# Patient Record
Sex: FEMALE | Race: WHITE | NOT HISPANIC OR LATINO | Employment: OTHER | URBAN - METROPOLITAN AREA
[De-identification: names, ages, dates, MRNs, and addresses within clinical notes are randomized per-mention and may not be internally consistent; named-entity substitution may affect disease eponyms.]

---

## 2017-04-17 ENCOUNTER — OFFICE VISIT (OUTPATIENT)
Dept: OBSTETRICS AND GYNECOLOGY | Age: 62
End: 2017-04-17

## 2017-04-17 VITALS
BODY MASS INDEX: 40.18 KG/M2 | SYSTOLIC BLOOD PRESSURE: 144 MMHG | DIASTOLIC BLOOD PRESSURE: 80 MMHG | WEIGHT: 256 LBS | HEIGHT: 67 IN

## 2017-04-17 DIAGNOSIS — R87.610 ATYPICAL SQUAMOUS CELLS OF UNDETERMINED SIGNIFICANCE ON CYTOLOGIC SMEAR OF CERVIX (ASC-US): ICD-10-CM

## 2017-04-17 DIAGNOSIS — D07.1 VIN III (VULVAR INTRAEPITHELIAL NEOPLASIA III): ICD-10-CM

## 2017-04-17 DIAGNOSIS — Z01.419 ENCOUNTER FOR GYNECOLOGICAL EXAMINATION: Primary | ICD-10-CM

## 2017-04-17 PROBLEM — B97.7 HPV IN FEMALE: Status: ACTIVE | Noted: 2017-04-17

## 2017-04-17 PROCEDURE — 99213 OFFICE O/P EST LOW 20 MIN: CPT | Performed by: OBSTETRICS & GYNECOLOGY

## 2017-04-17 NOTE — PROGRESS NOTES
"Subjective   Chief Complaint   Patient presents with   • Gynecologic Exam     6 month pap      History of Present Illness    Rosalba Moscoso is a very pleasant  62 y.o. female who presents for annual exam.  , Mammo Exam2014 , Contraception pm, Exercise 7 x wkly.  Patient had an abnormal Pap smear in October that showed ASCUS with positive HPV.  She is in for follow-up she did not want any further workup at that time she also is a history of KENRICK    Obstetric History:  OB History     No data available         Menstrual History:     No LMP recorded. Patient is postmenopausal.       Sexual History:       Past Medical History:   Diagnosis Date   • Bronchitis    • Hypertension    • Increased body mass index    • SAB (spontaneous )    • Uterine leiomyoma    • KENRICK III (vulvar intraepithelial neoplasia III)    • KENRICK III (vulvar intraepithelial neoplasia III)     History treated previously by Dr. Gino Duran   • KENRICK III (vulvar intraepithelial neoplasia III)      Past Surgical History:   Procedure Laterality Date   • BREAST AUGMENTATION     • COLONOSCOPY     • HAND SURGERY         SOCIAL Hx:      The following portions of the patient's history were reviewed and updated as appropriate: allergies, current medications, past family history, past medical history, past social history, past surgical history and problem list.    Review of Systems        Except as outlined in history of physical illness, patient denies any changes in her GYN, , GI systems.  All other systems reviewed are negative         Objective   Physical Exam    /80  Ht 67\" (170.2 cm)  Wt 256 lb (116 kg)  BMI 40.1 kg/m2    General: Patient is alert and oriented and appears overall healthy  Neck: Is supple without thyromegaly, no carotid bruits and no lymphadenopathy  Lungs: Clear bilaterally, no wheezing, rhonchi, or rales.  Respiratory rate is normal  Breast: Even symmetrical, no lymphadenopathy, no retraction, no masses appreciated on " either side  Heart: Regular rate and rhythm are appreciated, no murmurs or rubs are heard  Abdomen: Is soft, without organomegaly, bowel sounds are positive, there is no                                rebound or guarding and palpation does not produce any discomfort  Back: Nontender without CVA tenderness  Pelvic: External genitalia appear normal and consistent with mature female.  BUS normal                            Vagina is clean dry without discharge and appears adequately estrogenized, no               lesions or masses are present                                    Assessment/Plan   Rosalba was seen today for gynecologic exam.    Diagnoses and all orders for this visit:    Encounter for gynecological examination    KENRICK III (vulvar intraepithelial neoplasia III)  -     IGP, Apt HPV,rfx 16 / 18,45  No evidence of KENRICK on today's exam  Atypical squamous cells of undetermined significance on cytologic smear of cervix (ASC-US)  EPP Pap smear, if atypia or less returned office in 6 months time discussed nature and etiology of abnormal Pap smears and HPV    Annual Well Woman Exam    Discussed today's findings and concerns with patient in detail.  Continue to recommend regular exercise to include cardiovascular and resistance training and breast self-exam. Wellness lab, mammography, & pap smear, in accordance with age guidelines.  Dietary and caloric recommendations were discussed.    Frequency of bone density testing as well as colonoscopy given the patient's family and past history were reviewed.    All of the patient's questions were addressed and answered, I have encouraged her to call for today's test results if she has not received them within 10 days.  Patient is advised to call with any change in her condition or with any other questions, otherwise return in 12 months for annual examination.           EMR Dragon/ Transcription disclaimer:  Much of the encounter note is an electronic transcription/translation of  spoken language to printed text. The electronic translation of spoken language may permit erroneous, or at times, nonessential words or phrases to be inadvertently transcribes; Although i have reviewed the note for such errors, some may still exist.

## 2017-04-19 LAB
CYTOLOGIST CVX/VAG CYTO: NORMAL
CYTOLOGY CVX/VAG DOC THIN PREP: NORMAL
DX ICD CODE: NORMAL
HIV 1 & 2 AB SER-IMP: NORMAL
HPV I/H RISK 4 DNA CVX QL PROBE+SIG AMP: NEGATIVE
OTHER STN SPEC: NORMAL
PATH REPORT.FINAL DX SPEC: NORMAL
STAT OF ADQ CVX/VAG CYTO-IMP: NORMAL

## 2017-04-24 ENCOUNTER — TELEPHONE (OUTPATIENT)
Dept: OBSTETRICS AND GYNECOLOGY | Age: 62
End: 2017-04-24

## 2017-04-24 NOTE — TELEPHONE ENCOUNTER
----- Message from Aldair Hamilton MD sent at 4/23/2017  6:36 AM EDT -----  Please notify that her recent Pap smear was negative

## 2017-04-25 ENCOUNTER — TELEPHONE (OUTPATIENT)
Dept: OBSTETRICS AND GYNECOLOGY | Age: 62
End: 2017-04-25

## 2017-10-23 ENCOUNTER — OFFICE VISIT (OUTPATIENT)
Dept: OBSTETRICS AND GYNECOLOGY | Age: 62
End: 2017-10-23

## 2017-10-23 VITALS
WEIGHT: 248 LBS | HEIGHT: 67 IN | DIASTOLIC BLOOD PRESSURE: 70 MMHG | BODY MASS INDEX: 38.92 KG/M2 | SYSTOLIC BLOOD PRESSURE: 130 MMHG

## 2017-10-23 DIAGNOSIS — Z00.00 ENCOUNTER FOR ANNUAL PHYSICAL EXAM: ICD-10-CM

## 2017-10-23 DIAGNOSIS — Z01.419 ENCOUNTER FOR GYNECOLOGICAL EXAMINATION: Primary | ICD-10-CM

## 2017-10-23 DIAGNOSIS — B97.7 HPV IN FEMALE: ICD-10-CM

## 2017-10-23 DIAGNOSIS — R63.8 INCREASED BMI: ICD-10-CM

## 2017-10-23 DIAGNOSIS — D07.1 VIN III (VULVAR INTRAEPITHELIAL NEOPLASIA III): ICD-10-CM

## 2017-10-23 PROCEDURE — 99396 PREV VISIT EST AGE 40-64: CPT | Performed by: OBSTETRICS & GYNECOLOGY

## 2017-10-23 NOTE — PROGRESS NOTES
"Subjective   Chief Complaint   Patient presents with   • Gynecologic Exam     Annual      History of Present Illness    Rosalba Moscoso is a very pleasant  62 y.o. female who presents for annual exam.  , Mammo Exam Scheduled at [facility], Exercise 7 times a week    Patient's multiple medical problems were reviewed. She is seen her PCP in a regular basis for those. I discussed the importance of glycemic control exercise. She also has a history of vulvar intraepithelial neoplasia. She has had biopsies in the past and is scheduled to come back in 6 months for follow-up on that as well.  He has no gynecological concerns or complaints.    Obstetric History:  OB History     No data available         Menstrual History:     No LMP recorded. Patient is postmenopausal.       Sexual History:       Past Medical History:   Diagnosis Date   • Bronchitis    • Diabetes mellitus    • Hypertension    • Increased body mass index    • SAB (spontaneous )    • Uterine leiomyoma    • KENRICK III (vulvar intraepithelial neoplasia III)    • KENRICK III (vulvar intraepithelial neoplasia III)     History treated previously by Dr. Gino Duran   • KENRICK III (vulvar intraepithelial neoplasia III)      Past Surgical History:   Procedure Laterality Date   • BREAST AUGMENTATION     • COLONOSCOPY     • HAND SURGERY         SOCIAL Hx:      The following portions of the patient's history were reviewed and updated as appropriate: allergies, current medications, past family history, past medical history, past social history, past surgical history and problem list.    Review of Systems        Except as outlined in history of physical illness, patient denies any changes in her GYN, , GI systems.  All other systems reviewed are negative         Objective   Physical Exam    /70  Ht 67\" (170.2 cm)  Wt 248 lb (112 kg)  BMI 38.84 kg/m2    General: Patient is alert and oriented and appears overall healthy  Neck: Is supple without " thyromegaly, no carotid bruits and no lymphadenopathy  Lungs: Clear bilaterally, no wheezing, rhonchi, or rales.  Respiratory rate is normal  Breast: Even symmetrical, no lymphadenopathy, no retraction, no masses appreciated on either side  Heart: Regular rate and rhythm are appreciated, no murmurs or rubs are heard  Abdomen: Is soft, without organomegaly, bowel sounds are positive, there is no                                rebound or guarding and palpation does not produce any discomfort  Back: Nontender without CVA tenderness  Pelvic: External genitalia appear normal and consistent with mature female.  BUS normal, there are some mild epidermal changes on the vulva they appear smaller than previous exams. In no changes that are clinically worrisome today.                            Vagina is clean dry without discharge and appears adequately estrogenized, no               lesions or masses are present                         Cervix is noninflamed without discharge or lesions.  There is no cervical motion             tenderness.                Uterus is nonenlarged, without tenderness, and no masses or abnormalities are  present               Adnexa are non-enlarged, non tender               Rectal exam is declined today       Patient Active Problem List   Diagnosis   • HPV in female                Assessment/Plan   Rosalba was seen today for gynecologic exam.    Diagnoses and all orders for this visit:    Encounter for gynecological examination  -     IGP, Apt HPV,rfx 16 / 18,45 - ThinPrep Vial, Cervix    HPV in female    Encounter for annual physical exam    Increased BMI    KENRICK III (vulvar intraepithelial neoplasia III)  Exam is unchanged and overall reassuring today will return in 6 months for probable biopsy of a couple areas      Annual Well Woman Exam    Discussed today's findings and concerns with patient in detail.  Continue to recommend regular exercise to include cardiovascular and resistance training and  breast self-exam. Wellness lab, mammography, & pap smear, in accordance with age guidelines.  Dietary and caloric recommendations were discussed.        All of the patient's questions were addressed and answered, I have encouraged her to call for today's test results if she has not received them within 10 days.  Patient is advised to call with any change in her condition or with any other questions, otherwise return in 12 months for annual examination.

## 2018-04-24 ENCOUNTER — OFFICE VISIT (OUTPATIENT)
Dept: OBSTETRICS AND GYNECOLOGY | Age: 63
End: 2018-04-24

## 2018-04-24 VITALS
HEIGHT: 67 IN | BODY MASS INDEX: 38.92 KG/M2 | WEIGHT: 248 LBS | SYSTOLIC BLOOD PRESSURE: 142 MMHG | DIASTOLIC BLOOD PRESSURE: 76 MMHG

## 2018-04-24 DIAGNOSIS — N90.9 VULVAR DISORDER: Primary | ICD-10-CM

## 2018-04-24 PROCEDURE — 56605 BIOPSY OF VULVA/PERINEUM: CPT | Performed by: OBSTETRICS & GYNECOLOGY

## 2018-04-24 PROCEDURE — 56606 BIOPSY OF VULVA/PERINEUM: CPT | Performed by: OBSTETRICS & GYNECOLOGY

## 2018-04-24 NOTE — PROGRESS NOTES
Preoperative diagnosis history of KENRICK 3   Postoperative diagnosis same  Estimated blood loss; 3 cc  Complications none  Anesthetic; Xylocaine 2%  Procedure;;Vulvar biopsy ×3  After reviewing risk and benefits potential complications perineum was prepped and draped usual fashion. Area of lichen sclerosis or white thickening at the upper right side of the labia minora was infiltrated with 2% Xylocaine. 3 separate punch biopsies were performed and obtained and sent to pathology for further study. Silver nitrite sticks were utilized to minimize any bleeding everything was hemostatic patient seemed to tolerate the procedure well. We will await the results. And follow-up as needed. Patient will continue to use her cream for itching.

## 2018-04-27 LAB
DX ICD CODE: NORMAL
DX ICD CODE: NORMAL
PATH REPORT.FINAL DX SPEC: NORMAL
PATH REPORT.GROSS SPEC: NORMAL
PATH REPORT.SITE OF ORIGIN SPEC: NORMAL
PATHOLOGIST NAME: NORMAL
PAYMENT PROCEDURE: NORMAL

## 2018-05-08 ENCOUNTER — TELEPHONE (OUTPATIENT)
Dept: OBSTETRICS AND GYNECOLOGY | Age: 63
End: 2018-05-08

## 2018-05-08 NOTE — TELEPHONE ENCOUNTER
----- Message from Aldair Hamilton MD sent at 4/27/2018  8:58 PM EDT -----  Please notify pt. Biopsy showed no cancer. Keep f/up appt. As discussed

## 2018-12-11 ENCOUNTER — OFFICE VISIT (OUTPATIENT)
Dept: OBSTETRICS AND GYNECOLOGY | Age: 63
End: 2018-12-11

## 2018-12-11 VITALS
DIASTOLIC BLOOD PRESSURE: 92 MMHG | SYSTOLIC BLOOD PRESSURE: 160 MMHG | WEIGHT: 253 LBS | HEIGHT: 67 IN | BODY MASS INDEX: 39.71 KG/M2

## 2018-12-11 DIAGNOSIS — L90.0 LICHEN SCLEROSUS ET ATROPHICUS: ICD-10-CM

## 2018-12-11 DIAGNOSIS — Z01.419 ENCOUNTER FOR GYNECOLOGICAL EXAMINATION: Primary | ICD-10-CM

## 2018-12-11 DIAGNOSIS — B97.7 HPV IN FEMALE: ICD-10-CM

## 2018-12-11 DIAGNOSIS — N90.3 VULVAR INTRAEPITHELIAL NEOPLASIA (VIN): ICD-10-CM

## 2018-12-11 PROCEDURE — 99396 PREV VISIT EST AGE 40-64: CPT | Performed by: OBSTETRICS & GYNECOLOGY

## 2018-12-11 RX ORDER — CLONAZEPAM 1 MG/1
TABLET ORAL
COMMUNITY
Start: 2018-07-25

## 2018-12-11 RX ORDER — INSULIN GLARGINE 100 [IU]/ML
INJECTION, SOLUTION SUBCUTANEOUS
Refills: 3 | COMMUNITY
Start: 2018-10-28

## 2018-12-11 NOTE — PROGRESS NOTES
"Subjective   Chief Complaint   Patient presents with   • Gynecologic Exam     Annual:last pap 10/2017,mammo 2017      History of Present Illness    Rosalba Moscoso is a very pleasant  63 y.o. female who presents for annual exam.  , Mammo Exam May 2017, , Exercise 7 times a week  Patient overall is doing well no gynecological concerns or complaints. The Valisone is used once every 2-3 weeks when she has some itching and that seems to be quite effective. She is still seen her PCP for all other wellness and screening test..    Obstetric History:  OB History     No data available         Menstrual History:     No LMP recorded. Patient is postmenopausal.       Sexual History:       Past Medical History:   Diagnosis Date   • Bronchitis    • Diabetes mellitus (CMS/HCC)    • Hypertension    • Increased body mass index    • SAB (spontaneous )    • Uterine leiomyoma    • KENRICK III (vulvar intraepithelial neoplasia III)    • KENRICK III (vulvar intraepithelial neoplasia III)     History treated previously by Dr. Gino Duran   • KENRICK III (vulvar intraepithelial neoplasia III)      Past Surgical History:   Procedure Laterality Date   • BREAST AUGMENTATION     • COLONOSCOPY     • HAND SURGERY         SOCIAL Hx:      The following portions of the patient's history were reviewed and updated as appropriate: allergies, current medications, past family history, past medical history, past social history, past surgical history and problem list.    Review of Systems        Except as outlined in history of physical illness, patient denies any changes in her GYN, , GI systems.  All other systems reviewed are negative         Objective   Physical Exam    /92   Ht 170.2 cm (67\")   Wt 115 kg (253 lb)   BMI 39.63 kg/m²     General: Patient is alert and oriented and appears overall healthy  Neck: Is supple without thyromegaly, no carotid bruits and no lymphadenopathy  Lungs: Clear bilaterally, no wheezing, rhonchi, or rales.  " Respiratory rate is normal  Breast: Even symmetrical, no lymphadenopathy, no retraction, no masses appreciated on either side  Heart: Regular rate and rhythm are appreciated, no murmurs or rubs are heard  Abdomen: Is soft, without organomegaly, bowel sounds are positive, there is no                                rebound or guarding and palpation does not produce any discomfort  Back: Nontender without CVA tenderness  Pelvic: External genitalia appear normal and consistent with mature female.  BUS normal, there continues to be sclerotic type of changes all along the vulva especially pronounced on the labia minora bilaterally. Previous biopsy sites are well-healed. There is no ulceration or granular tissue noted. Essentially it looks unchanged from last exam              Urethra appears normal and without mass, bladder is nontender and without any               Masses.               Vagina is clean dry without discharge and appears adequately estrogenized, no               lesions or masses are present                         Cervix is noninflamed without discharge or lesions.  There is no cervical motion             tenderness.                Uterus is nonenlarged, without tenderness, and no masses or abnormalities are  present               Adnexa are non-enlarged, non tender               Rectal exam reveals adequate sphincter tone and no masses or lesions are                     appreciated on digital rectal examination.       Patient Active Problem List   Diagnosis   • HPV in female   • Vulvar intraepithelial neoplasia (KENRICK)   • Lichen sclerosus et atrophicus                Assessment/Plan   Rosalba was seen today for gynecologic exam.    Diagnoses and all orders for this visit:    Encounter for gynecological examination  -     IGP, Apt HPV,rfx 16 / 18,45    Vulvar intraepithelial neoplasia (KENRICK)/history  Examination is unchanged from last visit, will have patient return in April for a vulvar survey with probable  full R biopsies. Patient continues to have improvement on her symptoms    HPV in female    Lichen sclerosus et atrophicus  Stable as outlined above with Valisone.    Patient declined smoking cessation.      Annual Well Woman Exam    Discussed today's findings and concerns with patient in detail.  Continue to recommend regular exercise to include cardiovascular and resistance training and breast self-exam. Wellness lab, mammography, & pap smear, in accordance with age guidelines.  Dietary and caloric recommendations were discussed.        All of the patient's questions were addressed and answered, I have encouraged her to call for today's test results if she has not received them within 10 days.  Patient is advised to call with any change in her condition or with any other questions, otherwise return in 12 months for annual examination.

## 2019-05-07 ENCOUNTER — OFFICE VISIT (OUTPATIENT)
Dept: OBSTETRICS AND GYNECOLOGY | Age: 64
End: 2019-05-07

## 2019-05-07 VITALS
BODY MASS INDEX: 39.24 KG/M2 | WEIGHT: 250 LBS | DIASTOLIC BLOOD PRESSURE: 80 MMHG | HEIGHT: 67 IN | SYSTOLIC BLOOD PRESSURE: 144 MMHG

## 2019-05-07 DIAGNOSIS — N90.3 VULVAR INTRAEPITHELIAL NEOPLASIA (VIN): Primary | ICD-10-CM

## 2019-05-07 PROCEDURE — 56605 BIOPSY OF VULVA/PERINEUM: CPT | Performed by: OBSTETRICS & GYNECOLOGY

## 2019-05-07 NOTE — PROGRESS NOTES
Excisional Biopsy Procedure Note    Pre-operative Diagnosis: Suspicious lesion    Post-operative Diagnosis: same    Locations: 11 clock at vaginal introitus    Indications: History of vulvar intraepithelial neoplasia level 3    Anesthesia: Lidocaine 2% without epinephrine without added sodium bicarbonate    Procedure Details   The risks, benefits, indications, potential complications, and alternatives were explained to the patient and informed consent obtained.    The lesion and surrounding area was given a sterile prep using alcohol and draped in the usual sterile fashion. A scalpel, and vulvar punch biopsy was used to excise an elliptical area of skin approximately 1cm by 1cm. The wound was closed with application of silver nitrate and pressure, no suture was used . Antibiotic ointment and a sterile dressing applied. The specimen was sent for pathologic examination. The patient tolerated the procedure well.    EBL: minimal    Findings: Mild acetowhite change at 11:00    Condition: Stable    Complications:  None    Plan:  1. Instructed to keep the wound dry and covered for 24-48 hours and clean thereafter.  2. Warning signs of infection were reviewed.    3. Recommended that the patient use OTC acetaminophen as needed for pain.   4 patient will call for pathology report.    EMR Dragon/ Transcription disclaimer:  Much of the encounter note is an electronic transcription/translation of spoken language to printed text. The electronic translation of spoken language may permit erroneous, or at times, nonessential words or phrases to be inadvertently transcribes; Although i have reviewed the note for such errors, some may still exist.

## 2019-05-14 ENCOUNTER — TELEPHONE (OUTPATIENT)
Dept: OBSTETRICS AND GYNECOLOGY | Age: 64
End: 2019-05-14

## 2019-05-14 LAB
DX ICD CODE: NORMAL
PATH REPORT.FINAL DX SPEC: NORMAL
PATH REPORT.GROSS SPEC: NORMAL
PATH REPORT.MICROSCOPIC SPEC OTHER STN: NORMAL
PATH REPORT.SITE OF ORIGIN SPEC: NORMAL
PATHOLOGIST NAME: NORMAL
PAYMENT PROCEDURE: NORMAL

## 2019-05-14 NOTE — TELEPHONE ENCOUNTER
----- Message from Aldair Hamilton MD sent at 5/14/2019  1:20 PM EDT -----  Please notify pt. That labs are with in normal limits, consistent with lichen sclerosis as discussed, would like to repeat biopsy again next year as discussed

## 2019-05-14 NOTE — PROGRESS NOTES
Please notify pt. That labs are with in normal limits, consistent with lichen sclerosis as discussed, would like to repeat biopsy again next year as discussed

## 2019-12-26 ENCOUNTER — OFFICE VISIT (OUTPATIENT)
Dept: OBSTETRICS AND GYNECOLOGY | Age: 64
End: 2019-12-26

## 2019-12-26 VITALS
HEIGHT: 67 IN | SYSTOLIC BLOOD PRESSURE: 144 MMHG | WEIGHT: 263 LBS | BODY MASS INDEX: 41.28 KG/M2 | DIASTOLIC BLOOD PRESSURE: 84 MMHG

## 2019-12-26 DIAGNOSIS — E66.01 MORBIDLY OBESE (HCC): ICD-10-CM

## 2019-12-26 DIAGNOSIS — Z01.419 ENCOUNTER FOR GYNECOLOGICAL EXAMINATION: Primary | ICD-10-CM

## 2019-12-26 DIAGNOSIS — L90.0 LICHEN SCLEROSUS ET ATROPHICUS: ICD-10-CM

## 2019-12-26 PROCEDURE — G0101 CA SCREEN;PELVIC/BREAST EXAM: HCPCS | Performed by: OBSTETRICS & GYNECOLOGY

## 2019-12-26 RX ORDER — BLOOD SUGAR DIAGNOSTIC
STRIP MISCELLANEOUS
COMMUNITY
Start: 2019-10-23

## 2019-12-26 NOTE — PROGRESS NOTES
Subjective   Chief Complaint   Patient presents with   • Gynecologic Exam     Annual:last pap ,mammogram ,colonoscopy ,dexa       History of Present Illness    Rosalba Moscoso is a very pleasant  64 y.o. female who presents for annual exam.  , Mammo Exam December of this year, , Exercise walks her dog regularly  Patient has no new gynecological concerns or complaints.  She is run out of her Valisone and needs a refill on that.  But her lichen sclerosus is been relatively asymptomatic.  She is still seeing her PCP for other medical issues or concerns she is due for colonoscopy next year she is recently had a DEXA scan in 2019 consistent with osteopenia she previously diagnosed with KENRICK 3 but recent biopsies which were trying to do on a yearly basis have been negative the last couple exams.    Obstetric History:  OB History    None        Menstrual History:     No LMP recorded. Patient is postmenopausal.       Sexual History:       Past Medical History:   Diagnosis Date   • Bronchitis    • Diabetes mellitus (CMS/HCC)    • Hypertension    • Increased body mass index    • SAB (spontaneous )    • Uterine leiomyoma    • KENRICK III (vulvar intraepithelial neoplasia III)    • KENRICK III (vulvar intraepithelial neoplasia III)     History treated previously by Dr. Gino Duran   • KENRICK III (vulvar intraepithelial neoplasia III)      Past Surgical History:   Procedure Laterality Date   • BREAST AUGMENTATION     • COLONOSCOPY     • HAND SURGERY         SOCIAL Hx:      The following portions of the patient's history were reviewed and updated as appropriate: allergies, current medications, past family history, past medical history, past social history, past surgical history and problem list.    Review of Systems        Except as outlined in history of physical illness, patient denies any changes in her GYN, , GI systems.  All other systems reviewed are negative         Objective   Physical Exam    /84  "  Ht 170.2 cm (67\")   Wt 119 kg (263 lb)   Breastfeeding No   BMI 41.19 kg/m²     General: Patient is alert and oriented and appears overall healthy  Neck: Is supple without thyromegaly, no carotid bruits and no lymphadenopathy  Lungs: Clear bilaterally, no wheezing, rhonchi, or rales.  Respiratory rate is normal  Breast: Even symmetrical, no lymphadenopathy, no retraction, no masses appreciated on either side  Heart: Regular rate and rhythm are appreciated, no murmurs or rubs are heard  Abdomen: Is soft, without organomegaly, bowel sounds are positive, there is no                                rebound or guarding and palpation does not produce any discomfort  Back: Nontender without CVA tenderness  Pelvic: External genitalia appear  consistent with mature female.  BUS normal there is still some mildly white changes of her labia majora bilaterally but these seem to be less pronounced than previously            urethra appears normal and without mass, bladder is nontender and without any               Masses.               Vagina is clean dry without discharge and appears adequately estrogenized, no               lesions or masses are present                         Cervix is noninflamed without discharge or lesions.  There is no cervical motion             tenderness.                Uterus is nonenlarged, without tenderness, and no masses or abnormalities are  present               Adnexa are non-enlarged, non tender               Rectal exam reveals adequate sphincter tone and no masses or lesions are                     appreciated on digital rectal examination.      Annual Well Woman Exam     Patient Active Problem List   Diagnosis   • HPV in female   • Vulvar intraepithelial neoplasia (KENRICK)   • Lichen sclerosus et atrophicus   • Morbidly obese (CMS/HCC)                Assessment/Plan   Rosalba was seen today for gynecologic exam.    Diagnoses and all orders for this visit:    Encounter for gynecological " examination  -     IGP, Apt HPV,rfx 16 / 18,45    Lichen sclerosus et atrophicus  Patient has a history of KENRICK in 3, she has had biopsies in April 2018 and May 2019 that revealed lichen sclerosis.  We will repeat a biopsy in May 2020  Morbidly obese (CMS/HCC)    Other orders  -     betamethasone valerate (VALISONE) 0.1 % ointment; Apply  topically to the appropriate area as directed 3 (Three) Times a Week.    Can only have Pap smears every 2 years with her insurance    Discussed today's findings and concerns with patient.  Continue to recommend regular exercise including cardiovascular and resistance training as well as  breast self-exam. Wellness lab, mammography, & pap smear, in accordance with age guidelines.    I have encouraged her to call for today's test results if she has not received them within 10 days.  Patient is advised to call with any change in her condition or with any other questions, otherwise return  for annual examination.

## 2019-12-29 LAB
CYTOLOGIST CVX/VAG CYTO: NORMAL
CYTOLOGY CVX/VAG DOC CYTO: NORMAL
CYTOLOGY CVX/VAG DOC THIN PREP: NORMAL
DX ICD CODE: NORMAL
HIV 1 & 2 AB SER-IMP: NORMAL
HPV I/H RISK 4 DNA CVX QL PROBE+SIG AMP: NEGATIVE
OTHER STN SPEC: NORMAL
STAT OF ADQ CVX/VAG CYTO-IMP: NORMAL

## 2020-04-24 ENCOUNTER — TELEPHONE (OUTPATIENT)
Dept: OBSTETRICS AND GYNECOLOGY | Age: 65
End: 2020-04-24

## 2020-04-24 NOTE — TELEPHONE ENCOUNTER
Lm that I would discuss with  on 4/28/20 about a letter that needs to be sent to Medicare in regards to her previous hx of KENRICK and lichen sclerosus.

## 2020-04-24 NOTE — TELEPHONE ENCOUNTER
----- Message from Rosalba Moscoso sent at 4/24/2020 12:44 PM EDT -----  Regarding: Test Results Question  Contact: [phone]  From: Rosalba Moscoso  Sent: 4/23/2020 6:22 PM CDT  Subject: Testing 12-    I was billed by [employer] for $98.62. I called [facility] and they told me the code was submitted as Routine.  For Medicare to pay it has to be coded as Preventive Exam. I talked to Moraima at your office she called [facility] to change. I got another bill/ called Medicare. They said it still reads Routine. I requested paperwork to appeal. I paid bill because I don't want any issues with my credit.    Medicare told me if your office could supply any information such as pre-cancerous cells/ anything that could help then maybe Medicare would pay.  I have this procedure done every year and  never  charged.  Can your office send something to me to attach to paperwork for appeal.

## 2020-04-27 ENCOUNTER — DOCUMENTATION (OUTPATIENT)
Dept: OBSTETRICS AND GYNECOLOGY | Age: 65
End: 2020-04-27

## 2020-04-27 NOTE — PROGRESS NOTES
April 27, 2020    To whom it may concern    I am writing a letter on behalf of my patient Mrs Rosalba Moscoso  Date of birth 1955.  Patient has been seen, evaluated and treated in my office, for vulvar intraepithelial neoplasia as well as lichen sclerosis.  These visits were for the aforementioned diagnosis, with potential precancerous implications.    Thanks for your help in the care of this very pleasant patient.        Sincerely   Ai SPARKS  [facility]  [facility] [id number]

## 2020-04-28 ENCOUNTER — TELEPHONE (OUTPATIENT)
Dept: OBSTETRICS AND GYNECOLOGY | Age: 65
End: 2020-04-28

## 2020-04-28 NOTE — TELEPHONE ENCOUNTER
dictated a letter for medicare and reimbursement for her pap smear.She would like that mailed to her. Done.

## 2020-05-05 ENCOUNTER — OFFICE VISIT (OUTPATIENT)
Dept: OBSTETRICS AND GYNECOLOGY | Age: 65
End: 2020-05-05

## 2020-05-05 VITALS
HEIGHT: 67 IN | WEIGHT: 260 LBS | BODY MASS INDEX: 40.81 KG/M2 | DIASTOLIC BLOOD PRESSURE: 80 MMHG | SYSTOLIC BLOOD PRESSURE: 144 MMHG

## 2020-05-05 DIAGNOSIS — L90.0 LICHEN SCLEROSUS ET ATROPHICUS: Primary | ICD-10-CM

## 2020-05-05 PROCEDURE — 56605 BIOPSY OF VULVA/PERINEUM: CPT | Performed by: OBSTETRICS & GYNECOLOGY

## 2020-05-05 RX ORDER — PEN NEEDLE, DIABETIC 32GX 5/32"
NEEDLE, DISPOSABLE MISCELLANEOUS
COMMUNITY
Start: 2020-05-01

## 2020-05-05 NOTE — PROGRESS NOTES
Vulvar biopsy  Preop diagnosis-lichen sclerosus and a history of KENRICK 3  Postop diagnosis-same  Patient was in the dorsolithotomy decision.  Perineum was inspected there was some mild acetowhite lesions noted on the right labia minora as well as at the introitus.  This was similar if not somewhat decreased from our last examination.  The area on the right labia minora seem to be the most prominent and it was cleansed with alcohol then anesthetized with 2% Xylocaine and a vulvar punch biopsy was obtained and sent to pathology for further study  Silver nitrate was applied to the inside of the biopsy site and everything was hemostatic  Patient tolerated procedure well  Minimal blood loss  No complications  Patient will call for results if she has not heard within next 4 5 days and if benign will return the office in 1 year for repeat biopsy and vulvar inspection as well as Pap smear

## 2020-05-07 LAB
DX ICD CODE: NORMAL
PATH REPORT.FINAL DX SPEC: NORMAL
PATH REPORT.GROSS SPEC: NORMAL
PATH REPORT.SITE OF ORIGIN SPEC: NORMAL
PATHOLOGIST NAME: NORMAL
PAYMENT PROCEDURE: NORMAL

## 2020-05-27 ENCOUNTER — TELEPHONE (OUTPATIENT)
Dept: OBSTETRICS AND GYNECOLOGY | Age: 65
End: 2020-05-27

## 2020-05-27 NOTE — TELEPHONE ENCOUNTER
----- Message from Aldair Hamilton MD sent at 5/27/2020  8:23 AM EDT -----  Please notify pt. That labs are with in normal limits

## 2021-03-16 ENCOUNTER — BULK ORDERING (OUTPATIENT)
Dept: CASE MANAGEMENT | Facility: OTHER | Age: 66
End: 2021-03-16

## 2021-03-16 DIAGNOSIS — Z23 IMMUNIZATION DUE: ICD-10-CM

## 2021-05-25 ENCOUNTER — OFFICE VISIT (OUTPATIENT)
Dept: OBSTETRICS AND GYNECOLOGY | Age: 66
End: 2021-05-25

## 2021-05-25 VITALS
HEIGHT: 67 IN | BODY MASS INDEX: 39.24 KG/M2 | SYSTOLIC BLOOD PRESSURE: 140 MMHG | DIASTOLIC BLOOD PRESSURE: 76 MMHG | WEIGHT: 250 LBS

## 2021-05-25 DIAGNOSIS — B97.7 HPV IN FEMALE: Primary | ICD-10-CM

## 2021-05-25 DIAGNOSIS — L90.0 LICHEN SCLEROSUS ET ATROPHICUS: ICD-10-CM

## 2021-05-25 PROCEDURE — 56605 BIOPSY OF VULVA/PERINEUM: CPT | Performed by: OBSTETRICS & GYNECOLOGY

## 2021-05-25 RX ORDER — CLINDAMYCIN PHOSPHATE 10 MG/G
GEL TOPICAL 2 TIMES DAILY
COMMUNITY

## 2021-05-25 RX ORDER — DICLOFENAC SODIUM 75 MG/1
TABLET, DELAYED RELEASE ORAL
COMMUNITY
Start: 2021-03-22

## 2021-05-25 RX ORDER — METAXALONE 800 MG/1
TABLET ORAL
COMMUNITY
Start: 2021-03-22

## 2021-05-25 NOTE — PROGRESS NOTES
Subjective       History of Present Illness  Rosalba Moscoso is a 66 y.o. female is being seen today for evaluation of vulvar changes.  Patient has a history of KENRICK 3, also has had ongoing lichen sclerosis, she is on medicine for that.  And she is really been asymptomatic.  Not always using the cream as she should.  She had vulvar biopsies in 2000 and .  The last several have been benign lichen sclerosus changes without any KENRICK 3 or any KENRICK changes.  Patient has multiple medical issues including sciatica she is on a walker currently.  Chief Complaint   Patient presents with   • Gynecologic Exam     Vulvar biopsy:lichen sclerosus,last pap ,mammo    .        The following portions of the patient's history were reviewed and updated as appropriate: allergies, current medications, past family history, past medical history, past social history, past surgical history and problem list.    PAST MEDICAL HISTORY  Past Medical History:   Diagnosis Date   • Bronchitis    • Diabetes mellitus (CMS/HCC)    • Hypertension    • Increased body mass index    • SAB (spontaneous )    • Uterine leiomyoma    • KENRICK III (vulvar intraepithelial neoplasia III)    • KENRICK III (vulvar intraepithelial neoplasia III)     History treated previously by Dr. Gino Duran   • KENRICK III (vulvar intraepithelial neoplasia III)      OB History   No obstetric history on file.     Past Surgical History:   Procedure Laterality Date   • BREAST AUGMENTATION     • COLONOSCOPY     • HAND SURGERY       No family history on file.  Social History     Tobacco Use   Smoking Status Light Tobacco Smoker   Smokeless Tobacco Never Used       Current Outpatient Medications:   •  ACCU-CHEK KAMILLA PLUS test strip, USE 1 STRIP TO TEST BLOOD SUGAR 3 TIMES DAILY, Disp: , Rfl:   •  amLODIPine (NORVASC) 10 MG tablet, Take 10 mg by mouth Daily., Disp: , Rfl:   •  aspirin 81 MG EC tablet, Take 81 mg by mouth Daily., Disp: , Rfl:   •  BD PEN NEEDLE DERRICK U/F 32G  X 4 MM misc, , Disp: , Rfl:   •  betamethasone valerate (VALISONE) 0.1 % ointment, Apply  topically to the appropriate area as directed 3 (Three) Times a Week., Disp: 45 g, Rfl: 2  •  calcium carbonate (OS-ELADIA) 600 MG tablet, Take 600 mg by mouth 2 (Two) Times a Day With Meals., Disp: , Rfl:   •  cetirizine (ZyrTEC) 10 MG tablet, Take 10 mg by mouth Daily., Disp: , Rfl:   •  Cholecalciferol (VITAMIN D3) 2000 UNITS tablet, Take  by mouth., Disp: , Rfl:   •  clonazePAM (KlonoPIN) 1 MG tablet, TAKE 1 TABLET BY MOUTH EVERY NIGHT AS NEEDED FOR ANXIETY, Disp: , Rfl:   •  Insulin Glargine (BASAGLAR KWIKPEN) 100 UNIT/ML injection pen, INJECT 20 UNITS INTO THE SKIN EVERY MORNING, Disp: , Rfl: 3  •  Insulin Pen Needle (BD Pen Needle Belen U/F) 32G X 4 MM misc, USE AS DIRECTED, Disp: , Rfl:   •  metFORMIN (GLUCOPHAGE) 1000 MG tablet, Take 1,000 mg by mouth 2 (Two) Times a Day With Meals., Disp: , Rfl:   •  Multiple Vitamin (MULTI-VITAMIN DAILY PO), Take  by mouth., Disp: , Rfl:   •  sertraline (ZOLOFT) 100 MG tablet, Take 100 mg by mouth Daily., Disp: , Rfl:   •  simvastatin (ZOCOR) 20 MG tablet, Take 20 mg by mouth Every Night., Disp: , Rfl:   •  vitamin E 400 UNIT capsule, Take 400 Units by mouth Daily., Disp: , Rfl:   •  diclofenac (VOLTAREN) 75 MG EC tablet, , Disp: , Rfl:   •  insulin detemir (LEVEMIR) 100 UNIT/ML injection, Inject 20 Units under the skin Daily., Disp: , Rfl:   •  metaxalone (SKELAXIN) 800 MG tablet, , Disp: , Rfl:   •  potassium & sodium phosphates (PHOS-NAK) 280-160-250 MG pack packet, Take  by mouth 4 (Four) Times a Day With Meals & at Bedtime., Disp: , Rfl:   •  thalidomide (THALOMID) 50 MG capsule, Take 25 mg by mouth Every Night., Disp: , Rfl:     There is no immunization history on file for this patient.    Review of Systems       Except as outlined in history of physical illness, patient denies any changes in her GYN, , GI systems. All other systems reviewed are negative.    Objective   Physical  Exam   Alert and oriented, respirations unlabored, heart regular rate and rhythm   Pelvic external genitalia normal    Vulvar biopsy  Preop diagnosis-lichen sclerosus and a history of KENRICK 3  Postop diagnosis-same  Patient was in the dorsolithotomy decision.  Perineum was inspected there was some mild acetowhite lesions noted on the right labia minora as well as at the introitus.  This was similar if not somewhat decreased from our last examination.  The area on the right labia minora seem to be the most prominent and it was cleansed with alcohol then anesthetized with 2% Xylocaine and a vulvar punch biopsy was obtained and sent to pathology for further study  Silver nitrate was applied to the inside of the biopsy site and everything was hemostatic  Patient tolerated procedure well  Minimal blood loss  No complications  Patient will call for results if she has not heard within next 4 5 days and if benign will return the office in 1 year for repeat biopsy and vulvar inspection as well as Pap smear    Assessment/Plan   Diagnoses and all orders for this visit:    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 Lichen sclerosus et atrophicus    If no KENRICK changes, patient will return in 1 year for Pap smear             No orders of the defined types were placed in this encounter.          EMR Dragon/ Transcription disclaimer:  Much of the encounter note is an electronic transcription/translation of spoken language to printed text. The electronic translation of spoken language may permit erroneous, or at times, nonessential words or phrases to be inadvertently transcribes; Although i have reviewed the note for such errors, some may still exist.

## 2021-05-27 ENCOUNTER — TELEPHONE (OUTPATIENT)
Dept: OBSTETRICS AND GYNECOLOGY | Age: 66
End: 2021-05-27

## 2021-05-27 LAB
DX ICD CODE: NORMAL
PATH REPORT.FINAL DX SPEC: NORMAL
PATH REPORT.GROSS SPEC: NORMAL
PATH REPORT.RELEVANT HX SPEC: NORMAL
PATH REPORT.SITE OF ORIGIN SPEC: NORMAL
PATHOLOGIST NAME: NORMAL
PAYMENT PROCEDURE: NORMAL

## 2021-05-27 NOTE — TELEPHONE ENCOUNTER
----- Message from Aldair Hamilton MD sent at 5/27/2021  2:59 PM EDT -----   Please notify pt. That labs are with in normal limits

## 2022-05-31 ENCOUNTER — OFFICE VISIT (OUTPATIENT)
Dept: OBSTETRICS AND GYNECOLOGY | Age: 67
End: 2022-05-31

## 2022-05-31 VITALS
SYSTOLIC BLOOD PRESSURE: 150 MMHG | DIASTOLIC BLOOD PRESSURE: 80 MMHG | WEIGHT: 259 LBS | BODY MASS INDEX: 40.65 KG/M2 | HEIGHT: 67 IN

## 2022-05-31 DIAGNOSIS — D07.1 VIN III (VULVAR INTRAEPITHELIAL NEOPLASIA III): ICD-10-CM

## 2022-05-31 DIAGNOSIS — B97.7 HPV IN FEMALE: ICD-10-CM

## 2022-05-31 DIAGNOSIS — Z01.419 ENCOUNTER FOR GYNECOLOGICAL EXAMINATION: ICD-10-CM

## 2022-05-31 DIAGNOSIS — L90.0 LICHEN SCLEROSUS ET ATROPHICUS: Primary | ICD-10-CM

## 2022-05-31 DIAGNOSIS — E66.01 MORBIDLY OBESE: ICD-10-CM

## 2022-05-31 DIAGNOSIS — N90.3 VULVAR INTRAEPITHELIAL NEOPLASIA (VIN): ICD-10-CM

## 2022-05-31 PROCEDURE — G0101 CA SCREEN;PELVIC/BREAST EXAM: HCPCS | Performed by: OBSTETRICS & GYNECOLOGY

## 2022-05-31 PROCEDURE — 56605 BIOPSY OF VULVA/PERINEUM: CPT | Performed by: OBSTETRICS & GYNECOLOGY

## 2022-05-31 NOTE — PROGRESS NOTES
Subjective   Chief Complaint   Patient presents with   • Gynecologic Exam     Annual and vulvar biopsy,last pap ,mammo ,dexa ,colonoscopy       History of Present Illness    Rosalba Moscoso is a very pleasant  67 y.o. female .  , Mammo Exam January at [facility], , Exercise very little  Patient has a history of KENRICK and lichen sclerosis, we have been biopsing 1 to 2 years.  She feels like the area has improved.  She has no other gynecological concerns or complaints.    Obstetric History:  OB History    No obstetric history on file.        Menstrual History:     No LMP recorded. Patient is postmenopausal.       Sexual History:       Past Medical History:   Diagnosis Date   • Bronchitis    • Diabetes mellitus (HCC)    • Hypertension    • Increased body mass index    • SAB (spontaneous )    • Uterine leiomyoma    • KENRICK III (vulvar intraepithelial neoplasia III)    • KENRICK III (vulvar intraepithelial neoplasia III)     History treated previously by Dr. Gino Duran   • KENRICK III (vulvar intraepithelial neoplasia III)      Past Surgical History:   Procedure Laterality Date   • BREAST AUGMENTATION     • COLONOSCOPY     • HAND SURGERY         Current Outpatient Medications:   •  ACCU-CHEK KAMILLA PLUS test strip, USE 1 STRIP TO TEST BLOOD SUGAR 3 TIMES DAILY, Disp: , Rfl:   •  amLODIPine (NORVASC) 10 MG tablet, Take 10 mg by mouth Daily., Disp: , Rfl:   •  aspirin 81 MG EC tablet, Take 81 mg by mouth Daily., Disp: , Rfl:   •  BD PEN NEEDLE DERRICK U/F 32G X 4 MM misc, , Disp: , Rfl:   •  [START ON 2022] betamethasone valerate (VALISONE) 0.1 % ointment, Apply  topically to the appropriate area as directed 3 (Three) Times a Week., Disp: 45 g, Rfl: 2  •  calcium carbonate (OS-ELADIA) 600 MG tablet, Take 600 mg by mouth 2 (Two) Times a Day With Meals., Disp: , Rfl:   •  cetirizine (ZyrTEC) 10 MG tablet, Take 10 mg by mouth Daily., Disp: , Rfl:   •  Cholecalciferol (VITAMIN D3) 2000 UNITS tablet, Take  by  "mouth., Disp: , Rfl:   •  clindamycin (CLINDAGEL) 1 % gel, Apply  topically to the appropriate area as directed 2 (Two) Times a Day., Disp: , Rfl:   •  Insulin Glargine (BASAGLAR KWIKPEN) 100 UNIT/ML injection pen, INJECT 20 UNITS INTO THE SKIN EVERY MORNING, Disp: , Rfl: 3  •  metFORMIN (GLUCOPHAGE) 1000 MG tablet, Take 1,000 mg by mouth 2 (Two) Times a Day With Meals., Disp: , Rfl:   •  Multiple Vitamin (MULTI-VITAMIN DAILY PO), Take  by mouth., Disp: , Rfl:   •  potassium & sodium phosphates (PHOS-NAK) 280-160-250 MG pack packet, Take  by mouth 4 (Four) Times a Day With Meals & at Bedtime., Disp: , Rfl:   •  sertraline (ZOLOFT) 100 MG tablet, Take 100 mg by mouth Daily., Disp: , Rfl:   •  simvastatin (ZOCOR) 20 MG tablet, Take 20 mg by mouth Every Night., Disp: , Rfl:   •  vitamin E 400 UNIT capsule, Take 400 Units by mouth Daily., Disp: , Rfl:   •  clonazePAM (KlonoPIN) 1 MG tablet, TAKE 1 TABLET BY MOUTH EVERY NIGHT AS NEEDED FOR ANXIETY, Disp: , Rfl:   •  diclofenac (VOLTAREN) 75 MG EC tablet, , Disp: , Rfl:   •  insulin detemir (LEVEMIR) 100 UNIT/ML injection, Inject 20 Units under the skin Daily., Disp: , Rfl:   •  Insulin Pen Needle (BD Pen Needle Belen U/F) 32G X 4 MM misc, USE AS DIRECTED, Disp: , Rfl:   •  metaxalone (SKELAXIN) 800 MG tablet, , Disp: , Rfl:   •  thalidomide (THALOMID) 50 MG capsule, Take 25 mg by mouth Every Night., Disp: , Rfl:    SOCIAL Hx:  [unfilled]    The following portions of the patient's history were reviewed and updated as appropriate: allergies, current medications, past family history, past medical history, past social history, past surgical history and problem list.    Review of Systems      Urinary incontinence assessment discussed      Except as outlined in history of physical illness, patient denies any changes in her GYN, , GI systems.  All other systems reviewed are negative         Objective   Physical Exam    /80   Ht 170.2 cm (67\")   Wt 117 kg (259 lb)   " Breastfeeding No   BMI 40.57 kg/m²     General: Patient is alert and oriented and appears overall healthy  Neck: Is supple without thyromegaly, no carotid bruits and no lymphadenopathy  Lungs: Clear bilaterally, no wheezing, rhonchi, or rales.  Respiratory rate is normal  Breast: Even symmetrical, no lymphadenopathy, no retraction, no discharge ,no masses , lumps appreciated on either side  Heart: Regular rate and rhythm are appreciated, no murmurs or rubs are heard  Abdomen: Is soft, without organomegaly, bowel sounds are positive, there is no rebound or guarding and palpation does not produce any discomfort  Back: Nontender without CVA tenderness  Pelvic: External genitalia appear normal and consistent with mature female.  BUS normal, there continues to be a area on the right labia majora that is acetowhite, no vascular changes are noted this area was biopsied see below.                Urethra appears normal and without mass, bladder is nontender and without any lesions                        Urethral meatus is normal without scarring tenderness or masses                 Bladder is without tenderness or fullness                           Vagina is clean dry without discharge and , no lesions or masses are present                         Cervix is noninflamed without discharge or lesions.  There is no cervical motion tenderness.                Uterus is nonenlarged, without tenderness, and no masses or abnormalities are  present               Adnexa are non-enlarged, non tender               Rectal digital  exam reveals adequate sphincter tone and no masses or lesions are appreciated on digital rectal examination.       Patient Active Problem List   Diagnosis   • HPV in female   • Vulvar intraepithelial neoplasia (KENRICK)   • Lichen sclerosus et atrophicus   • Morbidly obese (HCC)                Assessment & Plan   Diagnoses and all orders for this visit:    1. Lichen sclerosus et atrophicus (Primary)  -     IGP, Apt  HPV,rfx 16 / 18,45    2. Encounter for gynecological examination  -     IGP, Apt HPV,rfx 16 / 18,45    3. KENRICK III (vulvar intraepithelial neoplasia III)  -     IGP, Apt HPV,rfx 16 / 18,45    4. HPV in female  -     IGP, Apt HPV,rfx 16 / 18,45    5. Vulvar intraepithelial neoplasia (KENRICK)    6. Morbidly obese (HCC)    Other orders  -     betamethasone valerate (VALISONE) 0.1 % ointment; Apply  topically to the appropriate area as directed 3 (Three) Times a Week.  Dispense: 45 g; Refill: 2      Discussed today's findings and concerns with patient.  Continue to recommend regular exercise including cardiovascular and resistance training as well as  breast self-exam. Wellness lab, mammography, & pap smear, in accordance with age guidelines.    I have encouraged her to call for today's test results if she has not received them within 10 days.  Patient is advised to call with any change in her condition or with any other questions, otherwise return  for annual examination.       Vulvar biopsy    Area of acetowhite changes on the right labia majora was cleansed with alcohol and injected with 2% Xylocaine.  A #3 vulvar punch biopsy used and small specimen was sent to pathology for further study there was no blood loss patient tolerated procedure well there were no complications

## 2022-06-03 LAB
CYTOLOGIST CVX/VAG CYTO: NORMAL
CYTOLOGY CVX/VAG DOC CYTO: NORMAL
CYTOLOGY CVX/VAG DOC THIN PREP: NORMAL
DX ICD CODE: NORMAL
HIV 1 & 2 AB SER-IMP: NORMAL
HPV I/H RISK 4 DNA CVX QL PROBE+SIG AMP: NEGATIVE
Lab: NORMAL
OTHER STN SPEC: NORMAL
STAT OF ADQ CVX/VAG CYTO-IMP: NORMAL

## 2022-06-07 ENCOUNTER — TELEPHONE (OUTPATIENT)
Dept: OBSTETRICS AND GYNECOLOGY | Age: 67
End: 2022-06-07

## 2022-06-07 NOTE — TELEPHONE ENCOUNTER
----- Message from Aldair Hamilton MD sent at 6/6/2022  9:37 AM EDT -----   Please notify pt. That labs are with in normal limits was still consistent with lichen sclerosis no precancerous changes okay to return next year

## 2022-06-07 NOTE — TELEPHONE ENCOUNTER
Pt called and was notified of results:  labs are with in normal limits was still consistent with lichen sclerosis no precancerous changes okay to return next year.  Pt verbalized understanding.

## 2023-06-06 ENCOUNTER — OFFICE VISIT (OUTPATIENT)
Dept: OBSTETRICS AND GYNECOLOGY | Age: 68
End: 2023-06-06
Payer: MEDICARE

## 2023-06-06 VITALS
SYSTOLIC BLOOD PRESSURE: 140 MMHG | WEIGHT: 261 LBS | HEIGHT: 67 IN | BODY MASS INDEX: 40.97 KG/M2 | DIASTOLIC BLOOD PRESSURE: 76 MMHG

## 2023-06-06 DIAGNOSIS — Z12.31 BREAST CANCER SCREENING BY MAMMOGRAM: ICD-10-CM

## 2023-06-06 DIAGNOSIS — B97.7 HPV IN FEMALE: ICD-10-CM

## 2023-06-06 DIAGNOSIS — N90.3 VULVAR INTRAEPITHELIAL NEOPLASIA (VIN): Primary | ICD-10-CM

## 2023-06-06 DIAGNOSIS — E66.01 MORBIDLY OBESE: ICD-10-CM

## 2023-06-06 DIAGNOSIS — L90.0 LICHEN SCLEROSUS ET ATROPHICUS: ICD-10-CM

## 2023-06-06 RX ORDER — IRBESARTAN 150 MG/1
1 TABLET ORAL NIGHTLY
COMMUNITY
Start: 2022-05-30

## 2023-06-06 NOTE — PROGRESS NOTES
Subjective       History of Present Illness  Rosalba Moscoso is a 68 y.o. female is being seen today for history of lichen sclerosus  Chief Complaint   Patient presents with    Gynecologic Exam     Gyn problem: LAC ,mammo ,Needs order for mammogram,[facility],vulvar biopsy for lichen sclerosus,colonoscopy    .        The following portions of the patient's history were reviewed and updated as appropriate: allergies, current medications, past family history, past medical history, past social history, past surgical history and problem list.    PAST MEDICAL HISTORY  Past Medical History:   Diagnosis Date    Bronchitis     Diabetes mellitus     Hypertension     Increased body mass index     SAB (spontaneous )     Uterine leiomyoma     KENRICK III (vulvar intraepithelial neoplasia III)     KENRICK III (vulvar intraepithelial neoplasia III)     History treated previously by Dr. Gino Duran    KENRICK III (vulvar intraepithelial neoplasia III)      OB History   No obstetric history on file.     Past Surgical History:   Procedure Laterality Date    BREAST AUGMENTATION      COLONOSCOPY      HAND SURGERY       No family history on file.  Social History     Tobacco Use   Smoking Status Light Smoker   Smokeless Tobacco Never       Current Outpatient Medications:     ACCU-CHEK KAMILLA PLUS test strip, USE 1 STRIP TO TEST BLOOD SUGAR 3 TIMES DAILY, Disp: , Rfl:     amLODIPine (NORVASC) 10 MG tablet, Take 1 tablet by mouth Daily., Disp: , Rfl:     aspirin 81 MG EC tablet, Take 1 tablet by mouth Daily., Disp: , Rfl:     BD PEN NEEDLE DERRICK U/F 32G X 4 MM misc, , Disp: , Rfl:     betamethasone valerate (VALISONE) 0.1 % ointment, Apply  topically to the appropriate area as directed 3 (Three) Times a Week., Disp: 45 g, Rfl: 2    calcium carbonate (OS-ELADIA) 600 MG tablet, Take 1 tablet by mouth 2 (Two) Times a Day With Meals., Disp: , Rfl:     cetirizine (ZyrTEC) 10 MG tablet, Take 1 tablet by mouth Daily., Disp: , Rfl:      Cholecalciferol (VITAMIN D3) 2000 UNITS tablet, Take  by mouth., Disp: , Rfl:     clonazePAM (KlonoPIN) 1 MG tablet, TAKE 1 TABLET BY MOUTH EVERY NIGHT AS NEEDED FOR ANXIETY, Disp: , Rfl:     Insulin Glargine (BASAGLAR KWIKPEN) 100 UNIT/ML injection pen, INJECT 20 UNITS INTO THE SKIN EVERY MORNING, Disp: , Rfl: 3    irbesartan (AVAPRO) 150 MG tablet, Take 1 tablet by mouth Every Night., Disp: , Rfl:     metFORMIN (GLUCOPHAGE) 1000 MG tablet, Take 1 tablet by mouth 2 (Two) Times a Day With Meals., Disp: , Rfl:     Multiple Vitamin (MULTI-VITAMIN DAILY PO), Take  by mouth., Disp: , Rfl:     potassium & sodium phosphates (PHOS-NAK) 280-160-250 MG pack packet, Take  by mouth 4 (Four) Times a Day With Meals & at Bedtime., Disp: , Rfl:     sertraline (ZOLOFT) 100 MG tablet, Take 1 tablet by mouth Daily., Disp: , Rfl:     simvastatin (ZOCOR) 20 MG tablet, Take 1 tablet by mouth Every Night., Disp: , Rfl:     vitamin E 400 UNIT capsule, Take 1 capsule by mouth Daily., Disp: , Rfl:     diclofenac (VOLTAREN) 75 MG EC tablet, , Disp: , Rfl:     Insulin Pen Needle (BD Pen Needle Belen U/F) 32G X 4 MM misc, USE AS DIRECTED, Disp: , Rfl:     metaxalone (SKELAXIN) 800 MG tablet, , Disp: , Rfl:   Immunization History   Administered Date(s) Administered    COVID-19 (PFIZER) BIVALENT 12+YRS 11/07/2022       Review of Systems       Except as outlined in history of physical illness, patient denies any changes in her GYN, , GI systems. All other systems reviewed are negative.    Objective   Physical Exam   Alert and oriented, respirations unlabored, heart regular rate and rhythm   Pelvic external genitalia shows lichen sclerosus more pronounced on the right labia minora and some at the introitus at 5-7 o'clock.  It is less prominent than before, no atypical vascular changes.  Bimanual and rectal exam unchanged      Assessment & Plan   Diagnoses and all orders for this visit:    1. Vulvar intraepithelial neoplasia (KENRICK) (Primary)    2.  Morbidly obese    3. Lichen sclerosus et atrophicus    4. HPV in female      We will rebiopsy again next year as last year's biopsy was normal           No orders of the defined types were placed in this encounter.          EMR Dragon/ Transcription disclaimer:  Much of the encounter note is an electronic transcription/translation of spoken language to printed text. The electronic translation of spoken language may permit erroneous, or at times, nonessential words or phrases to be inadvertently transcribes; Although i have reviewed the note for such errors, some may still exist.

## 2024-06-18 ENCOUNTER — OFFICE VISIT (OUTPATIENT)
Dept: OBSTETRICS AND GYNECOLOGY | Age: 69
End: 2024-06-18
Payer: MEDICARE

## 2024-06-18 VITALS
BODY MASS INDEX: 41.59 KG/M2 | SYSTOLIC BLOOD PRESSURE: 136 MMHG | WEIGHT: 265 LBS | DIASTOLIC BLOOD PRESSURE: 72 MMHG | HEIGHT: 67 IN

## 2024-06-18 DIAGNOSIS — L90.0 LICHEN SCLEROSUS ET ATROPHICUS: ICD-10-CM

## 2024-06-18 DIAGNOSIS — N90.3 VULVAR INTRAEPITHELIAL NEOPLASIA (VIN): ICD-10-CM

## 2024-06-18 DIAGNOSIS — Z12.31 BREAST CANCER SCREENING BY MAMMOGRAM: ICD-10-CM

## 2024-06-18 DIAGNOSIS — Z00.00 ENCOUNTER FOR ANNUAL PHYSICAL EXAM: ICD-10-CM

## 2024-06-18 DIAGNOSIS — E66.01 MORBIDLY OBESE: ICD-10-CM

## 2024-06-18 DIAGNOSIS — B97.7 HPV IN FEMALE: ICD-10-CM

## 2024-06-18 DIAGNOSIS — Z01.419 ENCOUNTER FOR GYNECOLOGICAL EXAMINATION: Primary | ICD-10-CM

## 2024-06-18 DIAGNOSIS — N90.0 VIN I (VULVAR INTRAEPITHELIAL NEOPLASIA I): ICD-10-CM

## 2024-06-18 PROCEDURE — 56605 BIOPSY OF VULVA/PERINEUM: CPT | Performed by: OBSTETRICS & GYNECOLOGY

## 2024-06-18 PROCEDURE — G0101 CA SCREEN;PELVIC/BREAST EXAM: HCPCS | Performed by: OBSTETRICS & GYNECOLOGY

## 2024-06-18 RX ORDER — DIPHENHYDRAMINE HCL 25 MG
25 CAPSULE ORAL
COMMUNITY

## 2024-06-18 NOTE — PROGRESS NOTES
Subjective   Chief Complaint   Patient presents with    Gynecologic Exam     Annual:LAC ,pap ,mammo ,colonoscopy ,dexa , Vulvar biopsy today, requesting no rectal due to hemorrhoids      History of Present Illness  Wellness exam  Rosalba Moscoso is a very pleasant  69 y.o. female .  , Mammo Exam order has been placed, , Exercise encouraged  Rosalba is here for GYN annual exam but also surveillance of her lichen sclerosus and history of KENRICK vulvar intraepithelial neoplasia  She is minimally symptomatic she is not using the medicine very frequently but when she does it takes care of the pruritus.  She has continued to see her PCP for wellness labs.  She also has bothered by hemorrhoids and requested that we defer the rectal exam.    Obstetric History:  OB History    No obstetric history on file.        Menstrual History:     No LMP recorded. Patient is postmenopausal.       Sexual History:       Past Medical History:   Diagnosis Date    Bronchitis     Diabetes mellitus     Hypertension     Increased body mass index     SAB (spontaneous )     Uterine leiomyoma     KENRICK III (vulvar intraepithelial neoplasia III)     KENRICK III (vulvar intraepithelial neoplasia III)     History treated previously by Dr. Gino Duran    KENRICK III (vulvar intraepithelial neoplasia III)      Past Surgical History:   Procedure Laterality Date    BREAST AUGMENTATION      COLONOSCOPY      HAND SURGERY         Current Outpatient Medications:     ACCU-CHEK KAMILLA PLUS test strip, USE 1 STRIP TO TEST BLOOD SUGAR 3 TIMES DAILY, Disp: , Rfl:     amLODIPine (NORVASC) 10 MG tablet, Take 1 tablet by mouth Daily., Disp: , Rfl:     aspirin 81 MG EC tablet, Take 1 tablet by mouth Daily., Disp: , Rfl:     BD PEN NEEDLE DERRICK U/F 32G X 4 MM misc, , Disp: , Rfl:     betamethasone valerate (VALISONE) 0.1 % ointment, Apply  topically to the appropriate area as directed 3 (Three) Times a Week., Disp: 45 g, Rfl: 2    calcium carbonate  "(OS-ELADIA) 600 MG tablet, Take 1 tablet by mouth 2 (Two) Times a Day With Meals., Disp: , Rfl:     cetirizine (ZyrTEC) 10 MG tablet, Take 1 tablet by mouth Daily., Disp: , Rfl:     Cholecalciferol (VITAMIN D3) 2000 UNITS tablet, Take  by mouth., Disp: , Rfl:     clonazePAM (KlonoPIN) 1 MG tablet, TAKE 1 TABLET BY MOUTH EVERY NIGHT AS NEEDED FOR ANXIETY, Disp: , Rfl:     diphenhydrAMINE (BENADRYL) 25 mg capsule, Take 1 capsule by mouth., Disp: , Rfl:     Insulin Glargine (BASAGLAR KWIKPEN) 100 UNIT/ML injection pen, INJECT 20 UNITS INTO THE SKIN EVERY MORNING, Disp: , Rfl: 3    irbesartan (AVAPRO) 150 MG tablet, Take 1 tablet by mouth Every Night., Disp: , Rfl:     metFORMIN (GLUCOPHAGE) 1000 MG tablet, Take 1 tablet by mouth 2 (Two) Times a Day With Meals., Disp: , Rfl:     Multiple Vitamin (MULTI-VITAMIN DAILY PO), Take  by mouth., Disp: , Rfl:     sertraline (ZOLOFT) 100 MG tablet, Take 1 tablet by mouth Daily., Disp: , Rfl:     simvastatin (ZOCOR) 20 MG tablet, Take 1 tablet by mouth Every Night., Disp: , Rfl:     vitamin E 400 UNIT capsule, Take 1 capsule by mouth Daily., Disp: , Rfl:     diclofenac (VOLTAREN) 75 MG EC tablet, , Disp: , Rfl:     Insulin Pen Needle (BD Pen Needle Belen U/F) 32G X 4 MM misc, USE AS DIRECTED, Disp: , Rfl:     metaxalone (SKELAXIN) 800 MG tablet, , Disp: , Rfl:    SOCIAL Hx:  [unfilled]    The following portions of the patient's history were reviewed and updated as appropriate: allergies, current medications, past family history, past medical history, past social history, past surgical history and problem list.    Review of Systems      Urinary incontinence assessment discussed      Except as outlined in history of physical illness, patient denies any changes in her GYN, , GI systems.  All other systems reviewed are negative         Objective   Physical Exam    /72   Ht 170.2 cm (67\")   Wt 120 kg (265 lb)   BMI 41.50 kg/m²     General: Patient is alert and oriented and " appears overall healthy  Neck: Is supple without thyromegaly, no carotid bruits and no lymphadenopathy  Lungs: Clear bilaterally, no wheezing, rhonchi, or rales.  Respiratory rate is normal  Breast: Even symmetrical, no lymphadenopathy, no retraction, no discharge ,no masses or lumps appreciated on either side  Heart: Regular rate and rhythm are appreciated, no murmurs or rubs are heard  Abdomen: Is soft, without organomegaly, bowel sounds are positive, there is no rebound or guarding and palpation does not produce any discomfort  Back: Nontender without CVA tenderness  Pelvic: External genitalia consistent with mature female.  BUS normal the area of lichen sclerosis is present it seems improved compared to last year.  There is minimal acetowhite changes on the right labia minora otherwise moderate improvement since previous exam                Urethra appears normal and without mass, bladder is nontender and without any lesions                        Urethral meatus is normal without scarring tenderness or masses                 Bladder is without tenderness or fullness                           Vagina is clean dry without discharge and , no lesions or masses are present                         Cervix is noninflamed without discharge or lesions.  There is no cervical motion tenderness.                Uterus is nonenlarged, without tenderness, and no masses or abnormalities are  present               Adnexa are non-enlarged, non tender               Rectal digital  exam per patient's request has been deferred       Patient Active Problem List   Diagnosis    HPV in female    Vulvar intraepithelial neoplasia (KENRICK)    Lichen sclerosus et atrophicus    Morbidly obese                Assessment & Plan   Diagnoses and all orders for this visit:    1. Encounter for gynecological examination (Primary)  -     Cancel: IGP, Apt HPV,rfx 16 / 18,45    2. KENRICK I (vulvar intraepithelial neoplasia I)  -     Cancel: IGP, Apt HPV,rfx  16 / 18,45    3. HPV in female  -     Cancel: IGP, Apt HPV,rfx 16 / 18,45    4. Vulvar intraepithelial neoplasia (KENRICK)    5. Morbidly obese    6. Lichen sclerosus et atrophicus    7. Breast cancer screening by mammogram  -     Mammo Screening Digital Tomosynthesis Bilateral With CAD; Future    8. Encounter for annual physical exam      Discussed today's findings and concerns with patient.  Continue to recommend regular exercise including cardiovascular and resistance training as well as  breast self-exam. Wellness lab, mammography, & pap smear, in accordance with age guidelines.    I have encouraged her to call for today's test results if she has not received them within 10 days.  Patient is advised to call with any change in her condition or with any other questions, otherwise return  for annual examination.       Additional procedure        6-18-24     VULVAR BIOPSY     Area on the right labia minora was cleansed with alcohol.  The area was infiltrated with 2% Xylocaine  A #3 size punch biopsy was utilized and specimen was sent to pathology for further examination and study  Blood loss was less than 2 cc  Skin lesion was approximately 5 mm  There were no complications and patient tolerated procedure well

## 2024-06-24 ENCOUNTER — TELEPHONE (OUTPATIENT)
Dept: OBSTETRICS AND GYNECOLOGY | Age: 69
End: 2024-06-24
Payer: MEDICARE

## 2024-07-23 ENCOUNTER — TELEPHONE (OUTPATIENT)
Dept: OBSTETRICS AND GYNECOLOGY | Age: 69
End: 2024-07-23
Payer: MEDICARE

## 2024-07-23 NOTE — TELEPHONE ENCOUNTER
[facility] called needing a a order for Left Breast Diagnostic with a Left Breast Ultrasound for a abnormal screening. Please advise    Fax information [phone]

## 2024-07-25 ENCOUNTER — TELEPHONE (OUTPATIENT)
Dept: OBSTETRICS AND GYNECOLOGY | Age: 69
End: 2024-07-25
Payer: MEDICARE

## 2024-07-25 DIAGNOSIS — R92.1 CALCIFICATION OF LEFT BREAST: Primary | ICD-10-CM

## 2024-07-25 PROBLEM — R92.8 ABNORMAL MAMMOGRAM OF LEFT BREAST: Status: ACTIVE | Noted: 2024-07-25

## 2024-07-25 NOTE — TELEPHONE ENCOUNTER
[facility] calling to request a left breast u/s order: Their fax# is [phone]. She needs it performed on 7/29/24.Thanks.

## 2024-07-30 ENCOUNTER — TELEPHONE (OUTPATIENT)
Dept: OBSTETRICS AND GYNECOLOGY | Age: 69
End: 2024-07-30
Payer: MEDICARE

## 2024-07-30 DIAGNOSIS — R92.1 CALCIFICATION OF LEFT BREAST: Primary | ICD-10-CM

## 2025-01-16 DIAGNOSIS — R92.1 CALCIFICATION OF LEFT BREAST: Primary | ICD-10-CM

## 2025-06-24 ENCOUNTER — OFFICE VISIT (OUTPATIENT)
Dept: OBSTETRICS AND GYNECOLOGY | Age: 70
End: 2025-06-24
Payer: MEDICARE

## 2025-06-24 VITALS
DIASTOLIC BLOOD PRESSURE: 82 MMHG | BODY MASS INDEX: 42.53 KG/M2 | HEIGHT: 67 IN | WEIGHT: 271 LBS | SYSTOLIC BLOOD PRESSURE: 144 MMHG

## 2025-06-24 DIAGNOSIS — N95.1 MENOPAUSAL SYMPTOMS: ICD-10-CM

## 2025-06-24 DIAGNOSIS — E66.01 MORBIDLY OBESE: ICD-10-CM

## 2025-06-24 DIAGNOSIS — Z12.31 BREAST CANCER SCREENING BY MAMMOGRAM: ICD-10-CM

## 2025-06-24 DIAGNOSIS — L90.0 LICHEN SCLEROSUS ET ATROPHICUS: ICD-10-CM

## 2025-06-24 DIAGNOSIS — K64.9 HEMORRHOIDS, UNSPECIFIED HEMORRHOID TYPE: Primary | ICD-10-CM

## 2025-06-24 DIAGNOSIS — D07.1 VIN III (VULVAR INTRAEPITHELIAL NEOPLASIA III): ICD-10-CM

## 2025-06-24 PROBLEM — D17.1 LIPOMA OF ANTERIOR CHEST WALL: Status: RESOLVED | Noted: 2025-06-24 | Resolved: 2025-06-24

## 2025-06-24 PROBLEM — D17.1 LIPOMA OF ANTERIOR CHEST WALL: Status: ACTIVE | Noted: 2025-06-24

## 2025-06-24 PROCEDURE — 99213 OFFICE O/P EST LOW 20 MIN: CPT | Performed by: OBSTETRICS & GYNECOLOGY

## 2025-06-24 RX ORDER — BUPROPION HYDROCHLORIDE 150 MG/1
150 TABLET, EXTENDED RELEASE ORAL
COMMUNITY
Start: 2025-04-09 | End: 2026-04-09

## 2025-06-24 RX ORDER — HYDROCORTISONE ACETATE 25 MG/1
25 SUPPOSITORY RECTAL 2 TIMES DAILY
Qty: 14 SUPPOSITORY | Refills: 0 | Status: SHIPPED | OUTPATIENT
Start: 2025-06-24 | End: 2025-06-30

## 2025-06-24 NOTE — PROGRESS NOTES
Subjective       History of Present Illness  Rosalba Moscoso is a 70 y.o. female is being seen today for 3 of lichen sclerosus and KENRICK 3.  Patient had a vulvar biopsy in  which was benign and she states she has no symptoms or discomfort in the vulvar area however she feels like she has a new hemorrhoid that has been unresponsive to some over-the-counter medical preparations  She is receiving wellness labs from her PCP last mammogram in 2024 was okay  Chief Complaint   Patient presents with    Gynecologic Exam     Gyn problem: LAC ,pap ,mammo ,colonoscopy ,dexa ,has a spot by rectum,? hemorrhoid   .        The following portions of the patient's history were reviewed and updated as appropriate: allergies, current medications, past family history, past medical history, past social history, past surgical history and problem list.    PAST MEDICAL HISTORY  Past Medical History:   Diagnosis Date    Bronchitis     Diabetes mellitus     Hypertension     Increased body mass index     SAB (spontaneous )     Uterine leiomyoma     KENRICK III (vulvar intraepithelial neoplasia III)     KENRICK III (vulvar intraepithelial neoplasia III)     History treated previously by Dr. Gino Duran    KENRICK III (vulvar intraepithelial neoplasia III)      OB History   No obstetric history on file.     Past Surgical History:   Procedure Laterality Date    BREAST AUGMENTATION      COLONOSCOPY      HAND SURGERY       No family history on file.  Social History     Tobacco Use   Smoking Status Light Smoker   Smokeless Tobacco Never       Current Outpatient Medications:     ACCU-CHEK KAMILLA PLUS test strip, USE 1 STRIP TO TEST BLOOD SUGAR 3 TIMES DAILY, Disp: , Rfl:     amLODIPine (NORVASC) 10 MG tablet, Take 1 tablet by mouth Daily., Disp: , Rfl:     aspirin 81 MG EC tablet, Take 1 tablet by mouth Daily., Disp: , Rfl:     BD PEN NEEDLE DERRICK U/F 32G X 4 MM misc, , Disp: , Rfl:     betamethasone valerate (VALISONE) 0.1 %  ointment, Apply  topically to the appropriate area as directed 3 (Three) Times a Week., Disp: 45 g, Rfl: 2    buPROPion SR (WELLBUTRIN SR) 150 MG 12 hr tablet, Take 1 tablet by mouth., Disp: , Rfl:     calcium carbonate (OS-ELADIA) 600 MG tablet, Take 1 tablet by mouth 2 (Two) Times a Day With Meals., Disp: , Rfl:     cetirizine (ZyrTEC) 10 MG tablet, Take 1 tablet by mouth Daily., Disp: , Rfl:     Cholecalciferol (VITAMIN D3) 2000 UNITS tablet, Take  by mouth., Disp: , Rfl:     clonazePAM (KlonoPIN) 1 MG tablet, TAKE 1 TABLET BY MOUTH EVERY NIGHT AS NEEDED FOR ANXIETY, Disp: , Rfl:     diphenhydrAMINE (BENADRYL) 25 mg capsule, Take 1 capsule by mouth., Disp: , Rfl:     Insulin Glargine (BASAGLAR KWIKPEN) 100 UNIT/ML injection pen, INJECT 20 UNITS INTO THE SKIN EVERY MORNING, Disp: , Rfl: 3    irbesartan (AVAPRO) 150 MG tablet, Take 1 tablet by mouth Every Night., Disp: , Rfl:     metFORMIN (GLUCOPHAGE) 1000 MG tablet, Take 1 tablet by mouth 2 (Two) Times a Day With Meals., Disp: , Rfl:     Multiple Vitamin (MULTI-VITAMIN DAILY PO), Take  by mouth., Disp: , Rfl:     sertraline (ZOLOFT) 100 MG tablet, Take 1 tablet by mouth Daily., Disp: , Rfl:     simvastatin (ZOCOR) 20 MG tablet, Take 1 tablet by mouth Every Night., Disp: , Rfl:     vitamin E 400 UNIT capsule, Take 1 capsule by mouth Daily., Disp: , Rfl:     diclofenac (VOLTAREN) 75 MG EC tablet, , Disp: , Rfl:     hydrocortisone (Anusol-HC) 25 MG suppository, Insert 1 suppository into the rectum 2 (Two) Times a Day for 7 days., Disp: 14 suppository, Rfl: 0    Insulin Pen Needle (BD Pen Needle Belen U/F) 32G X 4 MM misc, USE AS DIRECTED, Disp: , Rfl:     metaxalone (SKELAXIN) 800 MG tablet, , Disp: , Rfl:   Immunization History   Administered Date(s) Administered    COVID-19 (PFIZER) 12YRS+ (COMIRNATY) 10/14/2023, 07/09/2024, 01/23/2025    COVID-19 (PFIZER) BIVALENT 12+YRS 11/07/2022    COVID-19 (PFIZER) Purple Cap Monovalent 03/10/2021, 04/07/2021, 10/14/2021     Covid-19 (Pfizer) Gray Cap Monovalent 07/28/2022       Review of Systems       Except as outlined in history of physical illness, patient denies any changes in her GYN, , GI systems. All other systems reviewed are negative.    Objective   Physical Exam   Alert and oriented, respirations unlabored, heart regular rate and rhythm   Pelvic external genitalia female, large fat pad underneath the vulva.  Lichen sclerosis is mild maybe even slightly better than last year's exam she does have a hemorrhoid that does not appear to be thrombosed.  Cervix uterus adnexa nonenlarged non tender  Digital rectal exam no internal masses or abnormalities      Assessment & Plan   Diagnoses and all orders for this visit:    1. Hemorrhoids, unspecified hemorrhoid type (Primary)    2. Breast cancer screening by mammogram    3. Lichen sclerosus et atrophicus  -     IGP, Apt HPV,rfx 16 / 18,45    4. Menopausal symptoms  -     IGP, Apt HPV,rfx 16 / 18,45    5. KENRICK III (vulvar intraepithelial neoplasia III)  -     IGP, Apt HPV,rfx 16 / 18,45    6. Morbidly obese    Other orders  -     hydrocortisone (Anusol-HC) 25 MG suppository; Insert 1 suppository into the rectum 2 (Two) Times a Day for 7 days.  Dispense: 14 suppository; Refill: 0    I have asked Rosalba to call if her vulvar/rectal/hemorrhoidal symptoms do not improve             No orders of the defined types were placed in this encounter.          EMR Dragon/ Transcription disclaimer:  Much of the encounter note is an electronic transcription/translation of spoken language to printed text. The electronic translation of spoken language may permit erroneous, or at times, nonessential words or phrases to be inadvertently transcribes; Although i have reviewed the note for such errors, some may still exist.

## 2025-06-27 LAB
CYTOLOGIST CVX/VAG CYTO: NORMAL
CYTOLOGY CVX/VAG DOC CYTO: NORMAL
CYTOLOGY CVX/VAG DOC THIN PREP: NORMAL
DX ICD CODE: NORMAL
HPV I/H RISK 4 DNA CVX QL PROBE+SIG AMP: NEGATIVE
OTHER STN SPEC: NORMAL
SERVICE CMNT-IMP: NORMAL
STAT OF ADQ CVX/VAG CYTO-IMP: NORMAL

## 2025-06-30 ENCOUNTER — TELEPHONE (OUTPATIENT)
Dept: OBSTETRICS AND GYNECOLOGY | Age: 70
End: 2025-06-30
Payer: MEDICARE

## 2025-06-30 RX ORDER — HYDROCORTISONE 25 MG/G
CREAM TOPICAL
Qty: 30 G | Refills: 0 | Status: SHIPPED | OUTPATIENT
Start: 2025-06-30 | End: 2025-07-03

## 2025-07-14 DIAGNOSIS — Z09 FOLLOW-UP EXAM, 3-6 MONTHS SINCE PREVIOUS EXAM: Primary | ICD-10-CM

## 2025-07-14 DIAGNOSIS — R92.8 OTHER ABNORMAL AND INCONCLUSIVE FINDINGS ON DIAGNOSTIC IMAGING OF BREAST: ICD-10-CM
